# Patient Record
Sex: FEMALE | Race: BLACK OR AFRICAN AMERICAN | NOT HISPANIC OR LATINO | Employment: FULL TIME | URBAN - METROPOLITAN AREA
[De-identification: names, ages, dates, MRNs, and addresses within clinical notes are randomized per-mention and may not be internally consistent; named-entity substitution may affect disease eponyms.]

---
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
## 2024-01-15 ENCOUNTER — APPOINTMENT (OUTPATIENT)
Dept: OBSTETRICS AND GYNECOLOGY | Facility: CLINIC | Age: 19
End: 2024-01-15
Payer: COMMERCIAL

## 2024-03-28 ENCOUNTER — OFFICE VISIT (OUTPATIENT)
Dept: OBSTETRICS AND GYNECOLOGY | Facility: HOSPITAL | Age: 19
End: 2024-03-28
Payer: COMMERCIAL

## 2024-03-28 VITALS
WEIGHT: 136 LBS | DIASTOLIC BLOOD PRESSURE: 77 MMHG | HEIGHT: 64 IN | SYSTOLIC BLOOD PRESSURE: 117 MMHG | BODY MASS INDEX: 23.22 KG/M2

## 2024-03-28 DIAGNOSIS — Z11.3 SCREENING FOR STD (SEXUALLY TRANSMITTED DISEASE): Primary | ICD-10-CM

## 2024-03-28 DIAGNOSIS — Z01.419 WELL WOMAN EXAM: ICD-10-CM

## 2024-03-28 DIAGNOSIS — Z30.09 CONTRACEPTIVE USE EDUCATION: ICD-10-CM

## 2024-03-28 DIAGNOSIS — N90.60 LABIAL HYPERTROPHY: ICD-10-CM

## 2024-03-28 PROCEDURE — 99385 PREV VISIT NEW AGE 18-39: CPT | Performed by: ADVANCED PRACTICE MIDWIFE

## 2024-03-28 PROCEDURE — 87800 DETECT AGNT MULT DNA DIREC: CPT | Performed by: ADVANCED PRACTICE MIDWIFE

## 2024-03-28 PROCEDURE — 1036F TOBACCO NON-USER: CPT | Performed by: ADVANCED PRACTICE MIDWIFE

## 2024-03-28 PROCEDURE — 87661 TRICHOMONAS VAGINALIS AMPLIF: CPT | Performed by: ADVANCED PRACTICE MIDWIFE

## 2024-03-28 ASSESSMENT — ENCOUNTER SYMPTOMS
GASTROINTESTINAL NEGATIVE: 0
HEMATOLOGIC/LYMPHATIC NEGATIVE: 0
CARDIOVASCULAR NEGATIVE: 0
PSYCHIATRIC NEGATIVE: 0
EYES NEGATIVE: 1
CARDIOVASCULAR NEGATIVE: 1
MUSCULOSKELETAL NEGATIVE: 1
RESPIRATORY NEGATIVE: 1
ENDOCRINE NEGATIVE: 0
EYES NEGATIVE: 0
PSYCHIATRIC NEGATIVE: 1
CONSTITUTIONAL NEGATIVE: 0
GASTROINTESTINAL NEGATIVE: 1
NEUROLOGICAL NEGATIVE: 1
ENDOCRINE NEGATIVE: 1
CONSTITUTIONAL NEGATIVE: 1
HEMATOLOGIC/LYMPHATIC NEGATIVE: 1
MUSCULOSKELETAL NEGATIVE: 0
RESPIRATORY NEGATIVE: 0
ALLERGIC/IMMUNOLOGIC NEGATIVE: 1
ALLERGIC/IMMUNOLOGIC NEGATIVE: 0
NEUROLOGICAL NEGATIVE: 0

## 2024-03-28 ASSESSMENT — COLUMBIA-SUICIDE SEVERITY RATING SCALE - C-SSRS
1. IN THE PAST MONTH, HAVE YOU WISHED YOU WERE DEAD OR WISHED YOU COULD GO TO SLEEP AND NOT WAKE UP?: NO
2. HAVE YOU ACTUALLY HAD ANY THOUGHTS OF KILLING YOURSELF?: NO
6. HAVE YOU EVER DONE ANYTHING, STARTED TO DO ANYTHING, OR PREPARED TO DO ANYTHING TO END YOUR LIFE?: NO

## 2024-03-28 ASSESSMENT — PATIENT HEALTH QUESTIONNAIRE - PHQ9
1. LITTLE INTEREST OR PLEASURE IN DOING THINGS: NOT AT ALL
SUM OF ALL RESPONSES TO PHQ9 QUESTIONS 1 AND 2: 0
2. FEELING DOWN, DEPRESSED OR HOPELESS: NOT AT ALL

## 2024-03-28 ASSESSMENT — PAIN SCALES - GENERAL: PAINLEVEL: 0-NO PAIN

## 2024-03-28 NOTE — PROGRESS NOTES
"Assessment/Plan   Problem List Items Addressed This Visit             ICD-10-CM       Medium    Contraceptive use education Z30.09    Screening for STD (sexually transmitted disease) - Primary Z11.3    Relevant Orders    Trichomonas vaginalis, Nucleic Acid Detection    C. Trachomatis / N. Gonorrhoeae, Amplified Detection    HIV 1/2 Antigen/Antibody Screen with Reflex to Confirmation    Syphilis Screen with Reflex    Hepatitis B Surface Antigen    Hepatitis C Antibody    Well woman exam Z01.419       Alyssia Boss, LILIAN-CARLOS EDUARDOM PAOlmanS  Discussed birth control options for contraception and menses regulation. Provided pt with resource with contraceptive options and individual pamphlets for IUDs.  Plastics referral for cosmetic labiaplasty.   STD screening today.      Subjective   Amy Monterroso is a 18 y.o. female who is here for a routine exam.   Pt is concerned about her fertility. Menarche at age 12, started planning pregnancy at 16. She is currently not ready to be pregnant and plans to be in a few years. Pt has never been on contraceptives, does not use condoms.   Lmp 3/3/2024, cycles are typically 21-30 days. On her heaviest flow day she goes through 4-5 pads w/ severe cramping.  Additionally, pt does not like the appearance of her vagina, states that her \"labia are too long\" and it makes her hesitant to have sex. Would like to have surgical reconstruction. No pain or discomfort with intercourse.  Denies dysuria, vaginal itching, pelvic or abd pain.    Lives with: mom  Current employment:  at [employer]   T/E/D: marijuana smoking daily for pain control, no alcohol use  Up to date vision/dental: has dentist appt with 4/4 to evaluate her dental abscess will go to ER if fevers/chills or difficulty swallowing, not up to date for vision  PCP: none  Menstrual history: lmp 3/4/2024, regular, no contraceptive   Sexual history: not currently sexually active, last time was in January   Pregnancy history: never " "pregnant  G/P   T: P:  EAB:   Ectopic:   SAB:   L:      Diet: balanced  Exercise: does not regularly exercise    PMH, PSH, and Family hx reviewed and updated    Current contraception: none  Refill Y/N:    Last pap: n/a  History of abnormal Pap smear: no  Family history of breast, uterine,  ovarian cancer: no  Regular self breast exam: not applicable  Last mammogram: n/a  History of abnormal mammogram: no        Review of Systems   Constitutional: Negative.    HENT:  Positive for dental problem.         Potential dental abscess   Eyes: Negative.    Respiratory: Negative.     Cardiovascular: Negative.    Gastrointestinal: Negative.    Endocrine: Negative.    Genitourinary: Negative.    Musculoskeletal: Negative.    Skin: Negative.    Allergic/Immunologic: Negative.    Neurological: Negative.    Hematological: Negative.    Psychiatric/Behavioral: Negative.     All other systems reviewed and are negative.      Objective   /77   Ht 1.626 m (5' 4\")   Wt 61.7 kg (136 lb)   LMP 2024 (Approximate)   BMI 23.34 kg/m²     Physical Exam  Vitals reviewed.   Constitutional:       Appearance: Normal appearance.   Genitourinary:     General: Normal vulva.   Neurological:      Mental Status: She is alert.   Psychiatric:         Mood and Affect: Mood normal.         Behavior: Behavior normal.        "

## 2024-03-29 LAB
C TRACH RRNA SPEC QL NAA+PROBE: NEGATIVE
N GONORRHOEA DNA SPEC QL PROBE+SIG AMP: NEGATIVE
T VAGINALIS RRNA SPEC QL NAA+PROBE: NEGATIVE

## 2024-04-08 ENCOUNTER — TELEPHONE (OUTPATIENT)
Dept: OBSTETRICS AND GYNECOLOGY | Facility: HOSPITAL | Age: 19
End: 2024-04-08
Payer: COMMERCIAL

## 2024-04-08 NOTE — TELEPHONE ENCOUNTER
RN returned call to patient regarding script being sent to the wrong pharmacy  Patient was identified by name and   Patient states she received notifiecation that a tb vaccine was at the pharmacy  Patient informed no medications or scripts was sent by a provider on her visit on 3/28  Patient advised to call the pharamacy to see where the script came from  All questions and concerns addressed  JOHANNA Diego-RN

## 2024-07-11 ENCOUNTER — APPOINTMENT (OUTPATIENT)
Dept: PLASTIC SURGERY | Facility: CLINIC | Age: 19
End: 2024-07-11
Payer: COMMERCIAL